# Patient Record
Sex: FEMALE | Race: BLACK OR AFRICAN AMERICAN | Employment: UNEMPLOYED | URBAN - METROPOLITAN AREA
[De-identification: names, ages, dates, MRNs, and addresses within clinical notes are randomized per-mention and may not be internally consistent; named-entity substitution may affect disease eponyms.]

---

## 2022-12-08 ENCOUNTER — OFFICE VISIT (OUTPATIENT)
Dept: ORTHOPEDIC SURGERY | Age: 57
End: 2022-12-08
Payer: COMMERCIAL

## 2022-12-08 VITALS — BODY MASS INDEX: 34.74 KG/M2 | HEIGHT: 61 IN | WEIGHT: 184 LBS

## 2022-12-08 DIAGNOSIS — M25.511 ACUTE PAIN OF RIGHT SHOULDER: Primary | ICD-10-CM

## 2022-12-08 DIAGNOSIS — M12.811 RIGHT ROTATOR CUFF TEAR ARTHROPATHY: ICD-10-CM

## 2022-12-08 DIAGNOSIS — M12.812 LEFT ROTATOR CUFF TEAR ARTHROPATHY: ICD-10-CM

## 2022-12-08 DIAGNOSIS — M25.512 ACUTE PAIN OF LEFT SHOULDER: ICD-10-CM

## 2022-12-08 DIAGNOSIS — M75.102 LEFT ROTATOR CUFF TEAR ARTHROPATHY: ICD-10-CM

## 2022-12-08 DIAGNOSIS — M75.101 RIGHT ROTATOR CUFF TEAR ARTHROPATHY: ICD-10-CM

## 2022-12-08 NOTE — PROGRESS NOTES
Carolyn Ross (: 1965) is a 65 y.o. female, new patient, here for evaluation of the following chief complaint(s):  Shoulder Pain (Bilateral shoulders)       ASSESSMENT/PLAN:  Below is the assessment and plan developed based on review of pertinent history, physical exam, labs, studies, and medications. We discussed different treatment options for her bilateral rotator cuff arthropathy. We wrote recommendations down for her rehab facility. She will continue with ice, anti-inflammatories, activity modifications, and she will start a therapy regimen. We did discuss possibility of corticosteroid injection but she notes a bad experience with an injection in her right knee in the past.  We also discussed surgical treatment but she would like to hold off if at all possible. 1. Acute pain of right shoulder  -     XR SHOULDER RT AP/LAT MIN 2 V; Future  2. Acute pain of left shoulder  -     XR SHOULDER LT AP/LAT MIN 2 V; Future  3. Right rotator cuff tear arthropathy  4. Left rotator cuff tear arthropathy      Return if symptoms worsen or fail to improve. SUBJECTIVE/OBJECTIVE:  Carolyn Ross (: 1965) is a 65 y.o. female. She notes clicking and popping in the right shoulder. Pain has been ongoing for a number of years. She denies any specific injury recently. She is in a rehab facility and notes that she has limited function or ability to ambulate and therefore you she uses her shoulders more than usual.        No Known Allergies    Current Outpatient Medications   Medication Sig    ergocalciferol (ERGOCALCIFEROL) 50,000 unit capsule Take 1 Cap by mouth Every Saturday. mometasone (NASONEX) 50 mcg/Actuation nasal spray 2 Sprays by Nasal route daily. ibuprofen (MOTRIN) 800 mg tablet Take  by mouth two (2) times daily (after meals). sertraline (ZOLOFT) 50 mg tablet Take  by mouth daily. ferrous sulfate 300 mg (60 mg iron)/5 mL syrup Take  by mouth daily.     enalapril (VASOTEC) 5 mg tablet Take  by mouth daily. glyBURIDE (DIABETA) 2.5 mg tablet Take 5 mg by mouth daily (before breakfast). hydrochlorothiazide (HYDRODIURIL) 25 mg tablet Take 25 mg by mouth daily. meclizine (ANTIVERT) 25 mg tablet Take  by mouth three (3) times daily as needed. tramadol (ULTRAM) 50 mg tablet Take 50 mg by mouth every six (6) hours as needed. No current facility-administered medications for this visit. Social History     Socioeconomic History    Marital status: SINGLE     Spouse name: Not on file    Number of children: Not on file    Years of education: Not on file    Highest education level: Not on file   Occupational History    Not on file   Tobacco Use    Smoking status: Never     Passive exposure: Never    Smokeless tobacco: Never   Vaping Use    Vaping Use: Never used   Substance and Sexual Activity    Alcohol use: Not Currently    Drug use: Never    Sexual activity: Not Currently   Other Topics Concern    Not on file   Social History Narrative    Not on file     Social Determinants of Health     Financial Resource Strain: Not on file   Food Insecurity: Not on file   Transportation Needs: Not on file   Physical Activity: Not on file   Stress: Not on file   Social Connections: Not on file   Intimate Partner Violence: Not on file   Housing Stability: Not on file       History reviewed. No pertinent surgical history. History reviewed. No pertinent family history. OB History    No obstetric history on file. REVIEW OF SYSTEMS:    Patient denies any recent fever, chills, nausea, vomiting, chest pain, or shortness of breath. Vitals:  Ht 5' 1\" (1.549 m)   Wt 184 lb (83.5 kg)   BMI 34.77 kg/m²    Body mass index is 34.77 kg/m². PHYSICAL EXAM:  General exam: Patient is awake, alert, and oriented x3. Well-appearing. No acute distress.   She presents in a wheelchair    Heart/Lungs:  no respiratory distress, palpable pulses    Bilateral shoulders:  Neurovascular and sensory intact. There is decreased range of motion in all planes on active and passive exam.  There is crepitus with range of motion of the shoulders. There is pain with impingement testing including Mullins exam.  There is weakness noted with resisted abduction and resisted external rotation on exam.  Normal stability is noted. IMAGING:    XR Results (most recent):  Results from [place] encounter on 03/03/10    XR KNEE  AP / LATERAL /  OBLIQUE    Narrative         ICD Codes / Adm. Diagnosis:    /   HIP DJD  S/P L THR   MS  Examination:  KNEE 3 VWS L - [id number] - Mar 11 2010  8:05PM  Accession No:  [id number]  Reason:  TKR      REPORT:  FINDINGS: 3 views of the left knee demonstrate no evidence of acute fracture  or dislocation. There are postsurgical changes of total left knee  replacement. IMPRESSION: No evidence of fracture or dislocation. Interpreting/Reading Doctor: Arabella Fitzpatrick ([id number])  Transcribed: n/a on 03/11/2010  Approved: Arabella Fitzpatrick ([id number])  03/11/2010        Distribution:  Attending Doctor: Ame Johnston  Alternate Doctor: Ame Johnston         Orders Placed This Encounter    XR SHOULDER RT AP/LAT MIN 2 V     Standing Status:   Future     Number of Occurrences:   1     Standing Expiration Date:   12/9/2023    XR SHOULDER LT AP/LAT MIN 2 V     Standing Status:   Future     Number of Occurrences:   1     Standing Expiration Date:   12/9/2023              An electronic signature was used to authenticate this note.   -- Milton Conde DO

## 2022-12-08 NOTE — PATIENT INSTRUCTIONS
Date of appointment:  12/8/2022     Examining Physician: Randell Sy        Name:  Tonia Brown                                          YOB: 1965                               Medical record number: [id number]      Visit information      Reason for visit: Bilateral shoulder pain. Advanced rotator cuff arthropathy. We had a discussion today regarding further treatment options. She did not want to try corticosteroid injection today for her shoulders. Instead we can continue with anti-inflammatory treatment and topical anti-inflammatory treatment. Physical therapy to work on gentle passive range of motion and strengthening may provide some benefit. She would like to hold off on any surgical treatment if at all possible. She can follow-up on an as-needed basis.           Signed: Heather France DO